# Patient Record
Sex: MALE | Race: WHITE | NOT HISPANIC OR LATINO | Employment: STUDENT | URBAN - METROPOLITAN AREA
[De-identification: names, ages, dates, MRNs, and addresses within clinical notes are randomized per-mention and may not be internally consistent; named-entity substitution may affect disease eponyms.]

---

## 2017-03-30 ENCOUNTER — TRANSCRIBE ORDERS (OUTPATIENT)
Dept: ADMINISTRATIVE | Facility: HOSPITAL | Age: 22
End: 2017-03-30

## 2017-03-30 ENCOUNTER — HOSPITAL ENCOUNTER (OUTPATIENT)
Dept: RADIOLOGY | Facility: OTHER | Age: 22
Discharge: HOME | End: 2017-03-30
Payer: COMMERCIAL

## 2017-03-30 ENCOUNTER — ALLSCRIPTS OFFICE VISIT (OUTPATIENT)
Dept: OTHER | Facility: OTHER | Age: 22
End: 2017-03-30

## 2017-03-30 DIAGNOSIS — M25.511 PAIN IN RIGHT SHOULDER: ICD-10-CM

## 2017-03-30 DIAGNOSIS — M25.511 RIGHT SHOULDER PAIN, UNSPECIFIED CHRONICITY: Primary | ICD-10-CM

## 2017-03-30 PROCEDURE — 73030 X-RAY EXAM OF SHOULDER: CPT

## 2017-03-31 ENCOUNTER — HOSPITAL ENCOUNTER (OUTPATIENT)
Dept: RADIOLOGY | Facility: HOSPITAL | Age: 22
Discharge: HOME | End: 2017-03-31
Attending: ORTHOPAEDIC SURGERY | Admitting: RADIOLOGY
Payer: COMMERCIAL

## 2017-03-31 ENCOUNTER — HOSPITAL ENCOUNTER (OUTPATIENT)
Dept: RADIOLOGY | Facility: HOSPITAL | Age: 22
Discharge: HOME | End: 2017-03-31
Attending: ORTHOPAEDIC SURGERY
Payer: COMMERCIAL

## 2017-03-31 DIAGNOSIS — M25.511 RIGHT SHOULDER PAIN, UNSPECIFIED CHRONICITY: ICD-10-CM

## 2017-03-31 PROCEDURE — A9577 INJ MULTIHANCE: HCPCS | Performed by: ORTHOPAEDIC SURGERY

## 2017-03-31 PROCEDURE — 73222 MRI JOINT UPR EXTREM W/DYE: CPT

## 2017-03-31 PROCEDURE — 23350 INJECTION FOR SHOULDER X-RAY: CPT

## 2017-03-31 PROCEDURE — 77002 NEEDLE LOCALIZATION BY XRAY: CPT

## 2017-03-31 RX ORDER — SODIUM BICARBONATE 42 MG/ML
5 INJECTION, SOLUTION INTRAVENOUS
Status: COMPLETED | OUTPATIENT
Start: 2017-03-31 | End: 2017-03-31

## 2017-03-31 RX ORDER — LIDOCAINE HYDROCHLORIDE 10 MG/ML
5 INJECTION, SOLUTION INFILTRATION; PERINEURAL
Status: COMPLETED | OUTPATIENT
Start: 2017-03-31 | End: 2017-03-31

## 2017-03-31 RX ORDER — 0.9 % SODIUM CHLORIDE 0.9 %
50 VIAL (ML) INJECTION
Status: COMPLETED | OUTPATIENT
Start: 2017-03-31 | End: 2017-03-31

## 2017-03-31 RX ADMIN — LIDOCAINE HYDROCHLORIDE 2 ML: 10 INJECTION, SOLUTION INFILTRATION; PERINEURAL at 16:05

## 2017-03-31 RX ADMIN — SODIUM BICARBONATE 1 ML: 42 SOLUTION INTRAVENOUS at 16:05

## 2017-03-31 RX ADMIN — GADOBENATE DIMEGLUMINE 0.1 ML: 529 INJECTION, SOLUTION INTRAVENOUS at 17:26

## 2017-03-31 RX ADMIN — SODIUM CHLORIDE 3 ML: 9 INJECTION, SOLUTION INTRAMUSCULAR; INTRAVENOUS; SUBCUTANEOUS at 16:05

## 2017-03-31 RX ADMIN — IOHEXOL 3 ML: 300 INJECTION, SOLUTION INTRAVENOUS at 16:05

## 2017-04-03 ENCOUNTER — ALLSCRIPTS OFFICE VISIT (OUTPATIENT)
Dept: OTHER | Facility: OTHER | Age: 22
End: 2017-04-03

## 2017-04-27 ENCOUNTER — GENERIC CONVERSION - ENCOUNTER (OUTPATIENT)
Dept: OTHER | Facility: OTHER | Age: 22
End: 2017-04-27

## 2017-06-22 ENCOUNTER — GENERIC CONVERSION - ENCOUNTER (OUTPATIENT)
Dept: OTHER | Facility: OTHER | Age: 22
End: 2017-06-22

## 2017-06-27 ENCOUNTER — GENERIC CONVERSION - ENCOUNTER (OUTPATIENT)
Dept: OTHER | Facility: OTHER | Age: 22
End: 2017-06-27

## 2017-08-08 ENCOUNTER — GENERIC CONVERSION - ENCOUNTER (OUTPATIENT)
Dept: OTHER | Facility: OTHER | Age: 22
End: 2017-08-08

## 2017-09-11 ENCOUNTER — GENERIC CONVERSION - ENCOUNTER (OUTPATIENT)
Dept: OTHER | Facility: OTHER | Age: 22
End: 2017-09-11

## 2017-10-17 ENCOUNTER — TRANSCRIBE ORDERS (OUTPATIENT)
Dept: ADMINISTRATIVE | Facility: HOSPITAL | Age: 22
End: 2017-10-17

## 2017-10-17 ENCOUNTER — APPOINTMENT (OUTPATIENT)
Dept: RADIOLOGY | Facility: OTHER | Age: 22
End: 2017-10-17
Payer: COMMERCIAL

## 2017-10-17 ENCOUNTER — ALLSCRIPTS OFFICE VISIT (OUTPATIENT)
Dept: OTHER | Facility: OTHER | Age: 22
End: 2017-10-17

## 2017-10-17 DIAGNOSIS — S49.92XD INJURY OF LEFT UPPER ARM, SUBSEQUENT ENCOUNTER: Primary | ICD-10-CM

## 2017-10-17 DIAGNOSIS — M25.512 PAIN IN LEFT SHOULDER: ICD-10-CM

## 2017-10-17 PROCEDURE — 73030 X-RAY EXAM OF SHOULDER: CPT

## 2017-10-18 ENCOUNTER — HOSPITAL ENCOUNTER (OUTPATIENT)
Dept: RADIOLOGY | Facility: HOSPITAL | Age: 22
Discharge: HOME | End: 2017-10-18
Attending: ORTHOPAEDIC SURGERY | Admitting: RADIOLOGY
Payer: COMMERCIAL

## 2017-10-18 ENCOUNTER — HOSPITAL ENCOUNTER (OUTPATIENT)
Dept: MRI IMAGING | Facility: HOSPITAL | Age: 22
Discharge: HOME | End: 2017-10-18
Attending: ORTHOPAEDIC SURGERY
Payer: COMMERCIAL

## 2017-10-18 DIAGNOSIS — S49.92XD INJURY OF LEFT UPPER ARM, SUBSEQUENT ENCOUNTER: ICD-10-CM

## 2017-10-18 PROCEDURE — A9585 GADOBUTROL INJECTION: HCPCS | Performed by: ORTHOPAEDIC SURGERY

## 2017-10-18 PROCEDURE — 73222 MRI JOINT UPR EXTREM W/DYE: CPT

## 2017-10-18 PROCEDURE — 23350 INJECTION FOR SHOULDER X-RAY: CPT

## 2017-10-18 PROCEDURE — 77002 NEEDLE LOCALIZATION BY XRAY: CPT

## 2017-10-18 RX ADMIN — IOHEXOL 50 ML: 300 INJECTION, SOLUTION INTRAVENOUS at 15:10

## 2017-10-18 RX ADMIN — GADOBUTROL 0.2 ML: 604.72 INJECTION INTRAVENOUS at 15:10

## 2017-10-20 ENCOUNTER — GENERIC CONVERSION - ENCOUNTER (OUTPATIENT)
Dept: OTHER | Facility: OTHER | Age: 22
End: 2017-10-20

## 2017-10-23 ENCOUNTER — ALLSCRIPTS OFFICE VISIT (OUTPATIENT)
Dept: OTHER | Facility: OTHER | Age: 22
End: 2017-10-23

## 2017-10-26 ENCOUNTER — GENERIC CONVERSION - ENCOUNTER (OUTPATIENT)
Dept: OTHER | Facility: OTHER | Age: 22
End: 2017-10-26

## 2017-11-01 NOTE — PROGRESS NOTES
Assessment   1  Left shoulder pain (859 41) (M25 512)  2  Injury of left shoulder, initial encounter (959 2) (I91 82OV)    Plan    Injury of left shoulder, initial encounter, Left shoulder pain    · 1    · Follow-up After MRI Evaluation and Treatment  Follow-up with Dr Dustin Maldonado, sports    medicine  Status: Complete  Done: [id number]  Left shoulder pain    · * XR SHOULDER 2+ VIEW LEFT; Status:Resulted - Requires Verification,Retrospective By Protocol Authorization;   Done:    [id number] 01:41PM1       * MRI ARTHROGRAM LEFT SHOULDER; Status:Need Information - Financial Authorization; Requested WOI:[id number];      Perform:[facility]; Order Comments:25year old  with exam concerning for labral tear ; Due:17Oct2018; Last Updated By:Jayson Shah; 10/17/2017 3:21:38 PM;Ordered; For:Injury of left shoulder, initial encounter, Left shoulder pain; Ordered By:Tatiana Sandy; Annotations                 25year old  with exam concerning for labral tear  1 Amended By: Geovanna Castillo; Oct 20 2017 10:10 AM EST      Discussion/Summary      We discussed with Ambrose Antonio our findings being concerning for a labral tear given our testing today along with negative XR  Have ordered MR arthrogram and will follow up as soon as completed  No sports/gym until cleared by office  School note given reflecting this  The patient was counseled regarding diagnostic results,-- instructions for management,-- patient and family education,-- impressions,-- importance of compliance with treatment  The treatment plan was reviewed with the patient/guardian  The patient/guardian understands and agrees with the treatment plan      Chief Complaint   shoulder pain      History of Present Illness   25year old [facility]   On 10/7/17, he was at a baseball double header when during his first at bat, felt as though his shoulder dislocated with moderate pain   This resolved quickly after he reduced it himself within seconds after injury  He felt ok until hours later while sitting during game, he appreciated increasing pain at the anterior shoulder with some numbness of the arm  There was no appreciated strength deficits  He has known hx of posteroinferior glenoid labral tear treated by Dr Ammy Oscar conservatively back in the spring 2017  Review of Systems        Constitutional: No fever or chills, feels well, no tiredness, no recent weight loss or weight gain  Eyes: No complaints of red eyes, no eyesight problems  ENT: no complaints of loss of hearing, no nosebleeds, no sore throat  Cardiovascular: No complaints of chest pain, no palpitations, no leg claudication or lower extremity edema  Respiratory: No complaints of shortness of breath, no wheezing, no cough  Gastrointestinal: No complaints of abdominal pain, no constipation, no nausea or vomiting, no diarrhea or bloody stools  Genitourinary: No complaints of dysuria or incontinence, no hesitancy, no nocturia  Musculoskeletal: as noted in HPI  Integumentary: No complaints of skin rash or lesion, no itching or dry skin, no skin wounds  Neurological: No complaints of headache, no confusion, no numbness or tingling, no dizziness  Psychiatric: No suicidal thoughts, no anxiety, no depression  Endocrine: No muscle weakness, no frequent urination, no excessive thirst, no feelings of weakness  Active Problems   1  Glenoid labrum tear, right, subsequent encounter (V58 89,840 8) (S43 431D)  2  Injury of left shoulder, initial encounter (959 2) (S49 92XA)  3  Left shoulder pain (719 41) (M25 512)  4  Shoulder pain, right (719 41) (M25 511)    Past Medical History      The active problems and past medical history were reviewed and updated today  Surgical History      The surgical history was reviewed and updated today         Family History   Mother    · No pertinent family history  Family History    · No pertinent family history     The family history was reviewed and updated today  Social History    · Alcohol use (V49 89) (Z78 9)   · College student   · Drinks coffee   · Never a smoker   · Non smoker / tobacco user (V49 89) (Z78 9)  The social history was reviewed and updated today  The social history was reviewed and is unchanged  Current Meds   1  Aleve TABS; Therapy: (Recorded:30Mar2017) to Recorded     The medication list was reviewed and updated today  Allergies   1  No Known Drug Allergies    Vitals    Recorded: [id number] 01:37PM   Heart Rate 75   Systolic 577   Diastolic 87   Height 6 ft 2 in   Weight 224 lb    BMI Calculated 28 76   BSA Calculated 2 28   Pain Scale 7-8     Physical Exam      not the bicipital groove not the proximal clavicle not the midshaft clavicle not the distal clavicle deltoid not the subacromial bursa not the supraspinatus muscle not the trapexial Forward flexion: painful normal AROM  Extension: normal AROM  Abduction: painful normal AROM  Internal rotation: normal AROM  External rotation: normal AROM  Motor: pain exacerbated by all motions with 4/5 strength,-- 4/5 abduction,-- 4/5 internal rotation,-- 4/5 external rotation,-- 5/5 forward flexion-- and-- 5/5 extension  Special Tests: positive Painful Arc,-- positive Empty Can test,-- positive Sweet's test,-- positive Anterior Apprehension test,-- positive Speed's test,-- positive Belly Press test,-- positive Hornblowers' test,-- positive Anterior Load and Shift,-- equivocal Postterior Load and Shift,-- equivocal Posterior Apprehension test-- and-- positive Cross Body Adduction test, but-- negative Relocation test  coracoid No swelling Left Shoulder Appearance[de-identified] no AC joint hypertrophy,-- no AC joint step-off,-- no ecchymosis,-- no erythema,-- no deltoid atrophy-- and-- no trapezius atrophy  Tenderness: not the AC joint        Constitutional - General appearance: Normal  Cardiovascular - Pulses: Normal  Radial pulse was 2+ on the left  Neurologic - Sensation: Normal light touch sensation throughout left upper extremity intact  -- Upper extremity peripheral neuro exam: Normal       Eyes      Conjunctiva and lids: Normal        Results/Data   * XR SHOULDER 2+ VIEW LEFT1 [id number] 01:41PM1 Serge Hancock Order Number: [id number]      Test Name Result Flag Reference   XR SHOULDER 2+ VW LEFT1 (Report)1     LEFT SHOULDER           INDICATION: Left anterior shoulder pain-  COMPARISON: None           VIEWS: 3           IMAGES: 3           FINDINGS:           There is no acute fracture or dislocation  No degenerative changes  No lytic or blastic lesions are seen  Soft tissues are unremarkable  IMPRESSION:           No acute osseous abnormality  Workstation performed: [id number]           Signed by:      Edna Shanks MD      10/19/17                                      1  Amended By: Arlette Murphy; Oct 20 2017 10:10 AM EST      Views: of the left shoulder  Findings: no fracture,-- no dislocation,-- no bony lesions,-- the soft tissues were normal-- and-- normal joint spaces  Attending Note   Attending Note [facility]: Attending Note:1  I interviewed, took the history and examined the patient1 ,-- I discussed the case with the Resident and reviewed the Resident's note1 ,-- I supervised the Resident1 -- and-- I agree with the Resident management plan as it was presented to [facility]   Level of Participation:1  I was present in clinic and examined the patient1   I agree with the Resident's note1        1 Amended By: Arlette Murphy; Oct 20 2017 10:10 AM EST      Message   Return to work or school:    Lexy Prasad is under my professional care  He was seen in my office on 10/17/2017     He is not able to participate in sports or gym class      In regards to Mr Gabriela Goodman left shoulder injury, no sports or gym until cleared by our office  Planned re-evaluation after he gets further imaging  BARRY Kelley  Signatures    Electronically signed by : Pablo Genao DO; Oct 17 2017  2:56PM EST                       (Author)     Electronically signed by :  ABRIL Diego ; Oct 20 2017 10:10AM EST                       (Author)

## 2018-01-10 NOTE — RESULT NOTES
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[employer] baseball  COMPARISON: 10/17/2017 XR Left Shoulder     IMAGES: 4     FLUOROSCOPY TIME:  0 1 minutes     FINDINGS:     After the risks and benefits of the procedure were thoroughly explained, informed consent was obtained  The patient verbalized expressed understanding of the above risks and wished to proceed with the procedure  Final standard time-out procedure performed  4 mL of 1% lidocaine solution was utilized for local anesthesia  Intermittent fluoroscopy was utilized for placement of a 20 gauge 3 1/2 inch spinal needle within the left glenohumeral joint  After positioning was confirmed with 2 mL of Omnipaque 300,    15 mL of 0 2 ml Gadavist/50ml Normal Saline was injected into the joint  2 mL of 1% Xylocaine was also injected into the joint  The patient tolerated the procedure well  There were no complications  I asked the patient to call us with any questions, concerns, or acute problems  The patient expressed understanding of the above  The patient will report for MR imaging of the left shoulder  Before the procedure, patient reports 5 out of 10 pain  Immediately after the injection, the patient reports 5 out of 10 pain  IMPRESSION:     Successful shoulder arthrogram with gadolinium injection into the left glenohumeral joint  MRI of the shoulder is currently pending  With intraarticular injection of local anesthetic, patient reports no significant improvement in typical pain  Procedure was performed by Lerry Mcburney Coll, PA-C under the direct supervision of Dr Lucas Carroll       PERFORMED, DICTATED AND SIGNED BY: Jose Alfredo Vieyra PA-C       Workstation performed: [id number]     Signed by: Murali Caicedo MD   10/18/17     * MRI ARTHROGRAM LEFT SHOULDER [id number] 02:17PM Bryce Urias     Test Name Result Flag Reference   MRI ARTHROGRAM LEFT SHOULDER (Report)     MRI ARTHROGRAM LEFT SHOULDER     INDICATION: S49  92XD: Unspecified injury of left shoulder and upper arm, subsequent encounter  History taken directly from the electronic ordering system  Anterior left shoulder pain, baseball injury  COMPARISON: Left shoulder plain films from 10/17/2017  TECHNIQUE: MR images were obtained of the left shoulder including:   Localizer, axial T1 fat sat, axial PD fat sat, coronal T2 fat sat, coronal T1 fat sat, sagittal T1, Sagittal T2 fat sat on a 1 5 Ivonne unit  Images were acquired utilizing direct MR arthrography technique  Contrast material is identified within the glenohumeral joint consistent with successful intra-articular injection of contrast material      FINDINGS:     SUBCUTANEOUS TISSUES: Normal     JOINT CAPSULE: Intact, without inferior extravasation of contrast       ACROMION PROCESS: Normal      ROTATOR CUFF: Intact  SUBACROMIAL/SUBDELTOID BURSA: Normal       LONG HEAD OF BICEPS TENDON: There is a longitudinal split tear of the intracapsular portion of the long head of the biceps tendon (series 7 images 14 and 15 )     GLENOID LABRUM: There is a glenoid labral tear extending from the posterior superior labrum at the 2 o'clock position, clockwise to the posterior inferior labrum at the 4 o'clock position (series 4 images 15 through 18 )     GLENOHUMERAL JOINT: Intact       ACROMIOCLAVICULAR JOINT: Normal      BONE MARROW SIGNAL: There is mild marrow edema in the lesser tuberosity consistent with bone contusion (series 4 image 16 )       IMPRESSION:     There is a glenoid labral tear extending from the posterior superior labrum at the 2 o'clock position, clockwise to the posterior inferior labrum at the 4 o'clock position (series 4 images 15 through 18 )     Mild bone contusion in the lesser tuberosity (series 4 image 16 )     Combination of posterior labral tear and lesser tuberosity bone contusion suggest prior posterior shoulder dislocation  There is a longitudinal split tear of the intracapsular portion of the long head of the biceps tendon (series 7 images 14 and 15 )       Workstation performed: [id number]     Signed by:   Cheri Henry MD   10/19/17     * XR SHOULDER 2+ VIEW LEFT [id number] 01:41PM [facility] Order Number: [id number]     Test Name Result Flag Reference   XR SHOULDER 2+ VW LEFT (Report)     LEFT SHOULDER     INDICATION: Left anterior shoulder pain-  COMPARISON: None     VIEWS: 3     IMAGES: 3     FINDINGS:     There is no acute fracture or dislocation  No degenerative changes  No lytic or blastic lesions are seen  Soft tissues are unremarkable  IMPRESSION:     No acute osseous abnormality         Workstation performed: [id number]     Signed by:   Saji Ann MD   10/19/17

## 2018-01-13 VITALS
HEART RATE: 82 BPM | WEIGHT: 221.38 LBS | DIASTOLIC BLOOD PRESSURE: 83 MMHG | BODY MASS INDEX: 28.41 KG/M2 | HEIGHT: 74 IN | SYSTOLIC BLOOD PRESSURE: 130 MMHG

## 2018-01-13 VITALS
BODY MASS INDEX: 28.02 KG/M2 | SYSTOLIC BLOOD PRESSURE: 167 MMHG | DIASTOLIC BLOOD PRESSURE: 82 MMHG | WEIGHT: 218.25 LBS | HEART RATE: 84 BPM

## 2018-01-13 NOTE — CONSULTS
Therapy  Rehabilitation Services Referral:   Patient Status: routine  Diagnosis: Right Shoulder PosteriorInferior Labrum Tear (Collegiate )  Modalities: Modalities PRN Therapist discretion)  Exercise/Treatment: AROM/PROM, stretching, shoulder, eccentric, stabilization and strengthening/PRE  Precautions/Comments: Scapular and intrinsic shoulder muscle rehabilitation for stability and strength  Include functional rehabilitation specifically for baseball  Frequency: 1-2 times per week, for 12 weeks  Please send progress report  I hereby certify that the services indicated above are medically necessary        Signatures   Electronically signed by : ABRIL Miles ; Apr 27 2017  7:05PM EST                       (Author)

## 2018-01-14 VITALS
HEART RATE: 75 BPM | WEIGHT: 224 LBS | DIASTOLIC BLOOD PRESSURE: 87 MMHG | SYSTOLIC BLOOD PRESSURE: 136 MMHG | HEIGHT: 74 IN | BODY MASS INDEX: 28.75 KG/M2

## 2018-01-14 VITALS
BODY MASS INDEX: 28.62 KG/M2 | DIASTOLIC BLOOD PRESSURE: 83 MMHG | HEART RATE: 88 BPM | SYSTOLIC BLOOD PRESSURE: 138 MMHG | HEIGHT: 74 IN | WEIGHT: 223 LBS

## 2018-01-16 NOTE — MISCELLANEOUS
Message  Return to work or school:   Tony Blank is under my professional care  He was seen in my office on 10/17/2017     He is not able to participate in sports or gym class  In regards to Mr  Jurgen Camarena left shoulder injury, no sports or gym until cleared by our office  Planned re-evaluation after he gets further imaging  BARRY Li  Signatures   Electronically signed by : Butch Krause DO; Oct 17 2017  2:55PM EST                       (Author)    Electronically signed by : Butch Krause DO; Oct 17 2017  2:56PM EST                       (Author)    Electronically signed by : Butch Krause DO; Oct 17 2017  2:56PM EST                       (Author)    Electronically signed by :  ABRIL Figueroa ; Oct 20 2017 10:10AM EST                       (Author)

## 2018-01-22 VITALS
WEIGHT: 222 LBS | HEART RATE: 90 BPM | BODY MASS INDEX: 28.49 KG/M2 | HEIGHT: 74 IN | SYSTOLIC BLOOD PRESSURE: 139 MMHG | DIASTOLIC BLOOD PRESSURE: 85 MMHG

## 2021-03-02 NOTE — PROGRESS NOTES
Assessment  1  Tear of left glenoid labrum, initial encounter (840 8) (U86 725A)    Plan  Tear of left glenoid labrum, initial encounter    · Follow-up PRN Evaluation and Treatment  Follow-up  Status: Complete - Retrospective  Authorization  Done: [id number]    Discussion/Summary    The athlete has a posterior labral tear with evidence of posterior instability of his left shoulder  The long head biceps tendon split may or may not be real, I recommend he undergo surgical intervention if he has continued instability or pain  He wishes to try to delay surgery until after his senior season which I understand and we will rehabilitate him with the athletic trainers, again if he develops symptoms while rehabilitating that prevents him from performing his activities effectively then I do recommend arthroscopic labral repair with possible long head biceps tenodesis  This 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 and I will follow closely through there care  The patient was counseled regarding diagnostic results,-- instructions for management,-- prognosis,-- patient and family education,-- impressions,-- risks and benefits of treatment options  Chief Complaint  1  Shoulder Pain  left shoulder injury      History of Present Illness  Left shoulder problem, initial evaluation  Symptoms:  pain  The patient is currently experiencing symptoms  Exacerbating factors:  arm movement  Relieving factors:  rest,-- ice-- and-- nonsteroidal anti-inflammatory drugs  No associated symptoms are reported  Tracie Cueva is a 72-year-old right-hand-dominant male here today for initial evaluation of his left shoulder  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 [employer], and was previously evaluated by Dr Sheree Gowers 10/20/17, where he reviewed his MRI arthrogram from 10/18/17 which showed a left shoulder labral tear   Tracie Cueva reports that on 10/7/17, he dislocated his left shoulder on a check swing while batting during the fall baseball scrimmage  Alexandra Hsu reports that he reduced the dislocation himself, and e reports his pain increases throughout the day  Today, Alexandra Hsu reports a constant, achy pain that radiates into his left upper arm  Alexandra Hsu rates his pain as a 5 out of 10 pain scale, and reports that gentle movement and ibuprofen help control his symptoms  Alexandra Hsu denies any numbness, tingling, or prior injury to the left shoulder  Alexandra Hsu does have a labral tear in his right shoulder, which he elected nonoperative conservative management for and reports he is doing well with  Ultimately, Alexandra Hsu wishes to play his senior baseball season without undergoing surgery if possible, and is anxious to discuss this treatment options today  Review of Systems    Constitutional: No fever or chills, feels well, no tiredness, no recent weight loss or weight gain  Eyes: No complaints of red eyes, no eyesight problems  ENT: no complaints of loss of hearing, no nosebleeds, no sore throat  Cardiovascular: No complaints of chest pain, no palpitations, no leg claudication or lower extremity edema  Respiratory: No complaints of shortness of breath, no wheezing, no cough  Gastrointestinal: No complaints of abdominal pain, no constipation, no nausea or vomiting, no diarrhea or bloody stools  Genitourinary: No complaints of dysuria or incontinence, no hesitancy, no nocturia  Musculoskeletal: as noted in HPI  Integumentary: No complaints of skin rash or lesion, no itching or dry skin, no skin wounds  Neurological: No complaints of headache, no confusion, no numbness or tingling, no dizziness  Psychiatric: No suicidal thoughts, no anxiety, no depression  Endocrine: No muscle weakness, no frequent urination, no excessive thirst, no feelings of weakness  Active Problems  1  Glenoid labrum tear, right, subsequent encounter (V58 89,840 8) (S43 431D)   2  Injury of left shoulder, initial encounter (959 2) (S49 92XA)   3   Left shoulder pain (719 41) (M25 512)   4  Shoulder pain, right (719 41) (M25 511)   5  Tear of left glenoid labrum (840 8) (G32 725T)    Past Medical History    The active problems and past medical history were reviewed and updated today  Surgical History    The surgical history was reviewed and updated today  Family History  Mother    · No pertinent family history  Family History    · No pertinent family history    The family history was reviewed and updated today  Social History   · Alcohol use (V49 89) (Z78 9)   · College student   · Drinks coffee   · Never a smoker   · Non smoker / tobacco user (V49 89) (Z78 9)  The social history was reviewed and updated today  Current Meds   1  Aleve TABS; Therapy: (Recorded:30Mar2017) to Recorded    The medication list was reviewed and updated today  Allergies  1  No Known Drug Allergies    Vitals   Recorded: 01BWB5131 08:44AM   Heart Rate 88   Systolic 477   Diastolic 83   Height 6 ft 2 in   Weight 223 lb    BMI Calculated 28 63   BSA Calculated 2 28     Physical Exam  Respiratory: No labored breathing or audible wheezing  Abdomen: Flat, nondistended  ROM: equivalent both sides  Motor: Normal  Special Tests: equivocal Cast test,-- positive Sweet's test-- and-- positive Jerk test, but-- negative Drop Arm test-- and-- negative Empty Can test  Left Shoulder Appearance[de-identified] Normal  Tenderness: None except the greater tuberosity-- and-- lesser tuberosity     Constitutional - General appearance: Normal    Musculoskeletal - Gait and station: Normal -- Digits and nails: Normal -- Inspection/palpation of joints, bones, and muscles: Normal -- Muscle strength/tone: Normal -- Upper extremity compartments: Normal    Cardiovascular - Pulses: Normal    Skin - Skin and subcutaneous tissue: Normal    Neurologic - Sensation: Normal -- Upper extremity peripheral neuro exam: Normal    Psychiatric - Orientation to person, place, and time: Normal -- Mood and affect: Normal    Eyes Conjunctiva and lids: Normal     Pupils and irises: Normal        Results/Data  I personally reviewed the films/images/results in the office today  My interpretation follows  MRI Review Left shoulder MRI arthrogram demonstrates a tear of the posterior labrum and bone contusion Of the lesser tuberosity of the humeral head consistent with a posterior shoulder dislocation  Attending Note  Scribe Attestation:   Scribe Attestation I, Arielle Escalante, MS, LAT, ATC am acting as a scribe in the presence of the attending physician while the attending physician examines the patient  Physician Attestation:   Tiago Mcghee MD personally performed the services described in this documentation as scribed in my presence, and it is both accurate and complete        Signatures   Electronically signed by : ABRIL Scott ; Oct 23 2017 11:40AM EST                       (Author) never